# Patient Record
Sex: FEMALE | Race: WHITE | NOT HISPANIC OR LATINO | Employment: FULL TIME | URBAN - NONMETROPOLITAN AREA
[De-identification: names, ages, dates, MRNs, and addresses within clinical notes are randomized per-mention and may not be internally consistent; named-entity substitution may affect disease eponyms.]

---

## 2023-10-07 PROBLEM — R63.5 WEIGHT GAIN: Status: ACTIVE | Noted: 2023-10-07

## 2023-10-07 PROBLEM — N76.0 BACTERIAL VAGINOSIS: Status: ACTIVE | Noted: 2023-10-07

## 2023-10-07 PROBLEM — B96.89 BACTERIAL VAGINOSIS: Status: ACTIVE | Noted: 2023-10-07

## 2023-10-07 RX ORDER — PNV NO.95/FERROUS FUM/FOLIC AC 28MG-0.8MG
TABLET ORAL
COMMUNITY

## 2023-10-09 ENCOUNTER — APPOINTMENT (OUTPATIENT)
Dept: OBSTETRICS AND GYNECOLOGY | Facility: CLINIC | Age: 33
End: 2023-10-09
Payer: COMMERCIAL

## 2023-12-18 ENCOUNTER — OFFICE VISIT (OUTPATIENT)
Dept: OBSTETRICS AND GYNECOLOGY | Facility: CLINIC | Age: 33
End: 2023-12-18
Payer: COMMERCIAL

## 2023-12-18 VITALS
WEIGHT: 173.2 LBS | HEIGHT: 65 IN | DIASTOLIC BLOOD PRESSURE: 68 MMHG | BODY MASS INDEX: 28.86 KG/M2 | SYSTOLIC BLOOD PRESSURE: 120 MMHG

## 2023-12-18 DIAGNOSIS — Z12.4 ENCOUNTER FOR SCREENING FOR CERVICAL CANCER: ICD-10-CM

## 2023-12-18 DIAGNOSIS — Z01.419 ENCOUNTER FOR GYNECOLOGICAL EXAMINATION WITHOUT ABNORMAL FINDING: ICD-10-CM

## 2023-12-18 PROCEDURE — 99395 PREV VISIT EST AGE 18-39: CPT | Performed by: OBSTETRICS & GYNECOLOGY

## 2023-12-18 PROCEDURE — 88175 CYTOPATH C/V AUTO FLUID REDO: CPT

## 2023-12-18 PROCEDURE — 1036F TOBACCO NON-USER: CPT | Performed by: OBSTETRICS & GYNECOLOGY

## 2024-01-10 ENCOUNTER — TELEPHONE (OUTPATIENT)
Dept: OBSTETRICS AND GYNECOLOGY | Facility: CLINIC | Age: 34
End: 2024-01-10
Payer: COMMERCIAL

## 2024-01-10 LAB
CYTOLOGY CMNT CVX/VAG CYTO-IMP: NORMAL
LAB AP HPV GENOTYPE QUESTION: YES
LAB AP HPV HR: NORMAL
LAB AP PREVIOUS ABNORMAL HISTORY: NORMAL
LABORATORY COMMENT REPORT: NORMAL
LMP START DATE: NORMAL
PATH REPORT.TOTAL CANCER: NORMAL

## 2024-01-10 NOTE — TELEPHONE ENCOUNTER
----- Message from Matthew Carvalho MD sent at 1/10/2024 12:50 PM EST -----  Please inform patient of the normal Pap

## 2024-03-18 ENCOUNTER — TELEPHONE (OUTPATIENT)
Dept: PRIMARY CARE | Facility: CLINIC | Age: 34
End: 2024-03-18
Payer: COMMERCIAL

## 2024-03-18 DIAGNOSIS — L65.9 HAIR LOSS: Primary | ICD-10-CM

## 2024-03-18 NOTE — TELEPHONE ENCOUNTER
Also, I was just looking at previous notes and it does not look like I am seeing her for a very long time so probably the best advice would be for her to come in for a full assessment before we actually order lab work and render any opinions.  Tell her I would be happy to see her and see how long it has been since I saw her because she might be considered a new patient if it has been 3 years.  Thank you!

## 2024-03-18 NOTE — TELEPHONE ENCOUNTER
Please advise that we could check a TSH and I will place the order now.  Also sometimes a vitamin deficiency can create issues with what she describes and we usually recommend taking over-the-counter biotin supplement.  Thank you

## 2024-03-18 NOTE — TELEPHONE ENCOUNTER
Patient called and left . She has some concerns about brittle nails, dry skin, and thinning hair. She would like to know if you could place a full thyroid panel and whatever other necessary labs you think she should have done.

## 2024-03-21 ENCOUNTER — LAB (OUTPATIENT)
Dept: LAB | Facility: LAB | Age: 34
End: 2024-03-21
Payer: COMMERCIAL

## 2024-03-21 ENCOUNTER — OFFICE VISIT (OUTPATIENT)
Dept: PRIMARY CARE | Facility: CLINIC | Age: 34
End: 2024-03-21
Payer: COMMERCIAL

## 2024-03-21 DIAGNOSIS — Z13.220 LIPID SCREENING: Primary | ICD-10-CM

## 2024-03-21 DIAGNOSIS — L65.9 HAIR LOSS: ICD-10-CM

## 2024-03-21 DIAGNOSIS — Z00.00 ENCOUNTER FOR WELL ADULT EXAM WITHOUT ABNORMAL FINDINGS: Primary | ICD-10-CM

## 2024-03-21 PROBLEM — J34.2 NASAL SEPTAL DEVIATION: Status: RESOLVED | Noted: 2024-03-21 | Resolved: 2024-03-21

## 2024-03-21 PROBLEM — B96.89 BACTERIAL VAGINOSIS: Status: RESOLVED | Noted: 2023-10-07 | Resolved: 2024-03-21

## 2024-03-21 PROBLEM — R63.5 WEIGHT GAIN: Status: RESOLVED | Noted: 2023-10-07 | Resolved: 2024-03-21

## 2024-03-21 PROBLEM — N76.0 BACTERIAL VAGINOSIS: Status: RESOLVED | Noted: 2023-10-07 | Resolved: 2024-03-21

## 2024-03-21 LAB — TSH SERPL-ACNC: 2.24 MIU/L (ref 0.44–3.98)

## 2024-03-21 PROCEDURE — 36415 COLL VENOUS BLD VENIPUNCTURE: CPT

## 2024-03-21 PROCEDURE — 1036F TOBACCO NON-USER: CPT | Performed by: INTERNAL MEDICINE

## 2024-03-21 PROCEDURE — 84443 ASSAY THYROID STIM HORMONE: CPT

## 2024-03-21 PROCEDURE — 99395 PREV VISIT EST AGE 18-39: CPT | Performed by: INTERNAL MEDICINE

## 2024-03-21 ASSESSMENT — ENCOUNTER SYMPTOMS
BACK PAIN: 0
SHORTNESS OF BREATH: 0
FATIGUE: 0
ABDOMINAL PAIN: 0
DIARRHEA: 0
NAUSEA: 0
ARTHRALGIAS: 0
CHEST TIGHTNESS: 0
UNEXPECTED WEIGHT CHANGE: 0
WHEEZING: 0
PALPITATIONS: 0
VOMITING: 0
COUGH: 0
BLOOD IN STOOL: 0

## 2024-03-21 NOTE — PROGRESS NOTES
Subjective   Patient ID: Radha De Leon is a 33 y.o. female who presents for No chief complaint on file..  HPI  She is here today for her 1 year checkup.  She is looking well and for the most part reports feeling well.  We did conduct a full review of systems and her primary concern is that of some accelerated hair loss.  She reminds me that her grandmother suffered from thyroid condition and was diagnosed in her 30s.  We will have her get a TSH today and have agreed to contact her with results.  We also discussed possibly referral to dermatology if we do not find the cause of her hair loss.  We also discussed her overall health and she is working from home now most days.  She is at a computer but she states she has started exercising and gets on the [employer] bike most days with intense exercise.  She is already appreciated some weight loss and she is also noticed that her cloths are fitting more loosely.  We discussed how weight loss or fat loss rather can be appreciated in different ways and we also discussed how eating a healthy diet and controlling calories with exercise can afford long-term success without too much suffering.  We talked about possibly getting a standing desk and we talked about exercising outdoors with her family.  Her blood pressure is excellent today.  Overall she appears to be very healthy.  She also states that she has had an emotional time recently because a good friend has been diagnosed with a rare advanced cancer and she is only in her 30s.  We will contact her with the lab work and if everything goes according to plan we will see her back in 12 months for reevaluation  Review of Systems   Constitutional:  Negative for fatigue and unexpected weight change.   Respiratory:  Negative for cough, chest tightness, shortness of breath and wheezing.    Cardiovascular:  Negative for chest pain, palpitations and leg swelling.   Gastrointestinal:  Negative for abdominal pain, blood in stool,  diarrhea, nausea and vomiting.   Musculoskeletal:  Negative for arthralgias and back pain.     Objective   Physical Exam  Vitals and nursing note reviewed.   Constitutional:       General: She is not in acute distress.     Appearance: Normal appearance.   HENT:      Head: Normocephalic and atraumatic.      Nose: Nose normal.   Eyes:      Conjunctiva/sclera: Conjunctivae normal.   Cardiovascular:      Rate and Rhythm: Normal rate and regular rhythm.      Heart sounds: Normal heart sounds.   Pulmonary:      Effort: Pulmonary effort is normal. No respiratory distress.      Breath sounds: No wheezing.   Abdominal:      Palpations: Abdomen is soft.      Tenderness: There is no abdominal tenderness. There is no guarding.   Musculoskeletal:         General: No swelling. Normal range of motion.   Skin:     General: Skin is warm and dry.   Neurological:      General: No focal deficit present.      Mental Status: She is alert and oriented to person, place, and time.   Psychiatric:         Mood and Affect: Mood normal.         Behavior: Behavior normal.         Thought Content: Thought content normal.       Assessment/Plan   Problem List Items Addressed This Visit             ICD-10-CM    Hair loss L65.9     -We are checking a thyroid blood test and have agreed to contact her with results  -She has a grandmother who was diagnosed with thyroid disease in her mid 30s  -If her thyroid laboratory test is normal then we discussed referring her to dermatology for evaluation of her hair loss         Relevant Orders    TSH    Encounter for well adult exam without abnormal findings - Primary Z00.00     -On today's examination she appears to be well and we talked about nutrition along with exercise and healthy weight loss  -Her blood pressure is excellent and she has no abnormal physical findings  -She of course is encouraged to see her gynecologist on an annual basis as instructed by them               Estefani Cotto DO

## 2024-03-21 NOTE — ASSESSMENT & PLAN NOTE
-On today's examination she appears to be well and we talked about nutrition along with exercise and healthy weight loss  -Her blood pressure is excellent and she has no abnormal physical findings  -She of course is encouraged to see her gynecologist on an annual basis as instructed by them

## 2024-03-21 NOTE — ASSESSMENT & PLAN NOTE
-We are checking a thyroid blood test and have agreed to contact her with results  -She has a grandmother who was diagnosed with thyroid disease in her mid 30s  -If her thyroid laboratory test is normal then we discussed referring her to dermatology for evaluation of her hair loss

## 2024-03-21 NOTE — PATIENT INSTRUCTIONS
As we discussed we will have you get your thyroid blood test drawn today before leaving and I will contact you with results  If we did not find a source of hair loss  through thyroid testing then I will have you see dermatology for evaluation  -We will see you back in 1 year but sooner if any problem

## 2024-04-04 ENCOUNTER — LAB (OUTPATIENT)
Dept: LAB | Facility: LAB | Age: 34
End: 2024-04-04
Payer: COMMERCIAL

## 2024-04-04 DIAGNOSIS — Z13.220 LIPID SCREENING: ICD-10-CM

## 2024-04-04 LAB
CHOLEST SERPL-MCNC: 169 MG/DL (ref 0–199)
CHOLESTEROL/HDL RATIO: 2.9
HDLC SERPL-MCNC: 59 MG/DL
LDLC SERPL CALC-MCNC: 95 MG/DL
NON HDL CHOLESTEROL: 110 MG/DL (ref 0–149)
TRIGL SERPL-MCNC: 77 MG/DL (ref 0–149)
VLDL: 15 MG/DL (ref 0–40)

## 2024-04-04 PROCEDURE — 36415 COLL VENOUS BLD VENIPUNCTURE: CPT

## 2024-04-04 PROCEDURE — 80061 LIPID PANEL: CPT

## 2024-09-16 ENCOUNTER — HOSPITAL ENCOUNTER (OUTPATIENT)
Dept: RADIOLOGY | Facility: HOSPITAL | Age: 34
Discharge: HOME | End: 2024-09-16
Payer: COMMERCIAL

## 2024-09-16 ENCOUNTER — APPOINTMENT (OUTPATIENT)
Age: 34
End: 2024-09-16
Payer: COMMERCIAL

## 2024-09-16 VITALS
HEART RATE: 78 BPM | BODY MASS INDEX: 27.82 KG/M2 | OXYGEN SATURATION: 98 % | HEIGHT: 65 IN | WEIGHT: 167 LBS | SYSTOLIC BLOOD PRESSURE: 116 MMHG | DIASTOLIC BLOOD PRESSURE: 80 MMHG

## 2024-09-16 DIAGNOSIS — M54.2 NECK PAIN: Primary | ICD-10-CM

## 2024-09-16 DIAGNOSIS — M54.2 NECK PAIN: ICD-10-CM

## 2024-09-16 PROCEDURE — 1036F TOBACCO NON-USER: CPT | Performed by: INTERNAL MEDICINE

## 2024-09-16 PROCEDURE — 72040 X-RAY EXAM NECK SPINE 2-3 VW: CPT

## 2024-09-16 PROCEDURE — 72040 X-RAY EXAM NECK SPINE 2-3 VW: CPT | Performed by: RADIOLOGY

## 2024-09-16 PROCEDURE — 3008F BODY MASS INDEX DOCD: CPT | Performed by: INTERNAL MEDICINE

## 2024-09-16 PROCEDURE — 99213 OFFICE O/P EST LOW 20 MIN: CPT | Performed by: INTERNAL MEDICINE

## 2024-09-16 RX ORDER — NAPROXEN 500 MG/1
500 TABLET ORAL
Qty: 20 TABLET | Refills: 0 | Status: SHIPPED | OUTPATIENT
Start: 2024-09-16 | End: 2024-09-26

## 2024-09-16 ASSESSMENT — ENCOUNTER SYMPTOMS
NAUSEA: 0
DIARRHEA: 0
ABDOMINAL PAIN: 0
SHORTNESS OF BREATH: 0
FEVER: 0
NECK PAIN: 1
UNEXPECTED WEIGHT CHANGE: 0
VOMITING: 0
COUGH: 0
WHEEZING: 0
CHEST TIGHTNESS: 0
BACK PAIN: 0
BLOOD IN STOOL: 0
FATIGUE: 0
PALPITATIONS: 0

## 2024-09-16 NOTE — PATIENT INSTRUCTIONS
As we discussed I have ordered an x-ray of your neck and after leaving the office this morning you could go to the hospital to get that accomplished  Once the results are known I will contact you  I also sent a prescription into your pharmacy for medication called Naprosyn to be taken twice a day with food for the next 10 days  Please also look online for tips of adjusting your work environment because sometimes 1 can develop serious neck strain if you are sitting and looking at your computer in a nonergonomic fashion  Please call me if your condition is worsening as opposed to getting better and please give us an update at the end of your treatment

## 2024-09-16 NOTE — PROGRESS NOTES
Subjective   Patient ID: Radha De Leon is a 34 y.o. female who presents for Neck Pain.  She is here today for evaluation of her neck which began approximately 2 weeks ago.  She describes sometimes feeling a burning or numbness sensation on the right posterior lateral aspect of her neck.  She reminds me that she does a lot of desk work sitting in front of a computer and she also does part-time work at a nursing facility.  She states she does not recall any injuries leading up to this symptom.  She also has had no other issues such as numbness or tingling in her hands or feet.  She denies any facial asymmetry or vision disturbances or speech problems.  She states she also sometimes feels fullness in her right ear.  She denies any hearing loss.  We did conduct a full review of systems and on today's examination she exhibits good range of motion in her neck.  Her EACs are patent and TMs are unremarkable.  I do not appreciate any rash and there is no neck adenopathy.  I decided I will send her for an x-ray because she feels this is highly unusual in the context of her day-to-day living.  She states nothing has changed.  I also will place her on an anti-inflammatory for the next 10 days.  I will call her when the x-ray comes back and she will let me know if her condition is worsening as opposed to getting better.  I also discussed looking online to readjust the ergonomics of her work environment because neck strain is very common.  Review of Systems   Constitutional:  Negative for fatigue, fever and unexpected weight change.   HENT:  Negative for congestion.    Respiratory:  Negative for cough, chest tightness, shortness of breath and wheezing.    Cardiovascular:  Negative for chest pain, palpitations and leg swelling.   Gastrointestinal:  Negative for abdominal pain, blood in stool, diarrhea, nausea and vomiting.   Musculoskeletal:  Positive for neck pain. Negative for back pain.     Objective   Physical Exam  Vitals  and nursing note reviewed.   Constitutional:       General: She is not in acute distress.     Appearance: Normal appearance.   HENT:      Head: Normocephalic and atraumatic.   Eyes:      Conjunctiva/sclera: Conjunctivae normal.   Cardiovascular:      Rate and Rhythm: Normal rate and regular rhythm.      Heart sounds: Normal heart sounds.   Pulmonary:      Effort: No respiratory distress.      Breath sounds: No wheezing.   Abdominal:      Palpations: Abdomen is soft.      Tenderness: There is no abdominal tenderness. There is no guarding.   Musculoskeletal:         General: No swelling. Normal range of motion.   Skin:     General: Skin is warm and dry.   Neurological:      General: No focal deficit present.      Mental Status: She is alert and oriented to person, place, and time.   Psychiatric:         Behavior: Behavior normal.       Assessment/Plan   Problem List Items Addressed This Visit             ICD-10-CM    Neck pain - Primary M54.2     -Symptoms began approximately 2 weeks ago  -Complains of both numbness and sometimes burning on the right posterior lateral aspect of the neck  -No physical findings today  -I suspect that she does have possibly a nerve impingement or strain of the neck  -She will look at the ergonomics of her work environment and readjust  -I will have her go for an x-ray and have agreed to contact her with results  -I am placing her on Naprosyn twice daily for the next 10 days  -She will let us know how she is doing and she will call if her condition is worsening as opposed to getting better.         Relevant Medications    naproxen (Naprosyn) 500 mg tablet    Other Relevant Orders    XR cervical spine 2-3 views   PT INSTRUCTIONS  As we discussed I have ordered an x-ray of your neck and after leaving the office this morning you could go to the hospital to get that accomplished  Once the results are known I will contact you  I also sent a prescription into your pharmacy for medication called  Naprosyn to be taken twice a day with food for the next 10 days  Please also look online for tips of adjusting your work environment because sometimes 1 can develop serious neck strain if you are sitting and looking at your computer in a nonergonomic fashion  Please call me if your condition is worsening as opposed to getting better and please give us an update at the end of your treatment       Estefani Cotto, DO

## 2024-09-16 NOTE — ASSESSMENT & PLAN NOTE
-Symptoms began approximately 2 weeks ago  -Complains of both numbness and sometimes burning on the right posterior lateral aspect of the neck  -No physical findings today  -I suspect that she does have possibly a nerve impingement or strain of the neck  -She will look at the ergonomics of her work environment and readjust  -I will have her go for an x-ray and have agreed to contact her with results  -I am placing her on Naprosyn twice daily for the next 10 days  -She will let us know how she is doing and she will call if her condition is worsening as opposed to getting better.

## 2025-02-26 ENCOUNTER — OFFICE VISIT (OUTPATIENT)
Age: 35
End: 2025-02-26
Payer: COMMERCIAL

## 2025-02-26 VITALS
WEIGHT: 169 LBS | HEART RATE: 68 BPM | SYSTOLIC BLOOD PRESSURE: 120 MMHG | BODY MASS INDEX: 28.16 KG/M2 | HEIGHT: 65 IN | DIASTOLIC BLOOD PRESSURE: 70 MMHG

## 2025-02-26 DIAGNOSIS — Z13.220 SCREENING, LIPID: ICD-10-CM

## 2025-02-26 DIAGNOSIS — R10.13 EPIGASTRIC PAIN: ICD-10-CM

## 2025-02-26 DIAGNOSIS — R11.0 NAUSEA: ICD-10-CM

## 2025-02-26 DIAGNOSIS — M54.2 NECK PAIN: Primary | ICD-10-CM

## 2025-02-26 PROCEDURE — 3008F BODY MASS INDEX DOCD: CPT | Performed by: FAMILY MEDICINE

## 2025-02-26 PROCEDURE — 99214 OFFICE O/P EST MOD 30 MIN: CPT | Performed by: FAMILY MEDICINE

## 2025-02-26 RX ORDER — ONDANSETRON 8 MG/1
8 TABLET, ORALLY DISINTEGRATING ORAL EVERY 8 HOURS PRN
Qty: 20 TABLET | Refills: 0 | Status: SHIPPED | OUTPATIENT
Start: 2025-02-26 | End: 2025-03-05

## 2025-02-26 NOTE — PROGRESS NOTES
Upper abdominal swelling; states when she touches it makes her feel nauseated. Had seen [facility] in past for neck pain; still having that pain.

## 2025-02-26 NOTE — PROGRESS NOTES
Subjective  Radha De Leon is a 34 y.o. female who presents for Abdominal swelling.  HPI  Last few weeks has increased her physical activity.  Noticed a spot on her stomach that is getting bigger.  Felt the urge to vomit last night, had to sleep on two pillows.   Not tender to cough, but causes nausea.  No nsaids.  No vomiting.  Periods regular.  No cough, no fevers/chills, cough.  Had spaghetti for dinner last night, felt full, no pain.  Normal bms, no blood no dark tarry stools.  Neck pain , was seen in fall and had imaging, still bothering her quite a bit.  Right side of neck worse.  Works from home, doing some stretches etc.  Review of Systems   All other systems reviewed and are negative.  .    No Known Allergies    Current Outpatient Medications on File Prior to Visit   Medication Sig Dispense Refill    B complex-vitamin C-folic acid (Nephro-Yogi Rx) 1- mg-mg-mcg tablet Take 1 tablet by mouth once daily with breakfast.      iron,carb/vit C/vit B12/folic (IRON 100 PLUS ORAL) Take by mouth.      prenatal vit no.124-iron-folic (Prenatal Vitamin) 27 mg iron- 800 mcg tablet Take by mouth.       No current facility-administered medications on file prior to visit.       Patient Active Problem List   Diagnosis    Hair loss    Lipid screening    Neck pain       Objective     Visit Vitals  /70 (BP Location: Left arm, Patient Position: Sitting)   Pulse 68      Physical Exam  Vitals and nursing note reviewed.   Constitutional:       General: She is not in acute distress.     Appearance: Normal appearance. She is not toxic-appearing.   HENT:      Head: Normocephalic and atraumatic.   Cardiovascular:      Rate and Rhythm: Normal rate and regular rhythm.      Heart sounds: No murmur heard.  Pulmonary:      Effort: Pulmonary effort is normal.      Breath sounds: Normal breath sounds.   Abdominal:      Tenderness: There is no abdominal tenderness. There is no guarding or rebound.   Musculoskeletal:       Cervical back: Neck supple. No rigidity.      Comments:     Skin:     General: Skin is warm and dry.   Neurological:      General: No focal deficit present.      Mental Status: She is alert and oriented to person, place, and time.   Psychiatric:         Mood and Affect: Mood normal.         Behavior: Behavior normal.         Assessment/Plan   Problem List Items Addressed This Visit       Neck pain - Primary    Relevant Orders    Referral to Physical Therapy     Other Visit Diagnoses       Screening, lipid        Relevant Orders    Lipid Panel    Epigastric pain        Relevant Orders    CBC and Auto Differential    Comprehensive Metabolic Panel    Lipase    XR abdomen 2 views w chest 1 view    Urinalysis with Reflex Microscopic    hCG, Urine, Qualitative    Nausea        Relevant Medications    ondansetron ODT (Zofran-ODT) 8 mg disintegrating tablet             Follow up 1- 2 weeks with PCP, consider further imaging depending on symptoms and test results. Call concerns.       Rayne Bartlett MD

## 2025-02-27 LAB
ALBUMIN SERPL-MCNC: 4.6 G/DL (ref 3.6–5.1)
ALP SERPL-CCNC: 40 U/L (ref 31–125)
ALT SERPL-CCNC: 7 U/L (ref 6–29)
ANION GAP SERPL CALCULATED.4IONS-SCNC: 7 MMOL/L (CALC) (ref 7–17)
APPEARANCE UR: CLEAR
AST SERPL-CCNC: 10 U/L (ref 10–30)
BASOPHILS # BLD AUTO: 48 CELLS/UL (ref 0–200)
BASOPHILS NFR BLD AUTO: 0.7 %
BILIRUB SERPL-MCNC: 0.6 MG/DL (ref 0.2–1.2)
BILIRUB UR QL STRIP: NEGATIVE
BUN SERPL-MCNC: 12 MG/DL (ref 7–25)
CALCIUM SERPL-MCNC: 9.5 MG/DL (ref 8.6–10.2)
CHLORIDE SERPL-SCNC: 105 MMOL/L (ref 98–110)
CHOLEST SERPL-MCNC: 188 MG/DL
CHOLEST/HDLC SERPL: 3 (CALC)
CO2 SERPL-SCNC: 27 MMOL/L (ref 20–32)
COLOR UR: YELLOW
CREAT SERPL-MCNC: 0.87 MG/DL (ref 0.5–0.97)
EGFRCR SERPLBLD CKD-EPI 2021: 88 ML/MIN/1.73M2
EOSINOPHIL # BLD AUTO: 170 CELLS/UL (ref 15–500)
EOSINOPHIL NFR BLD AUTO: 2.5 %
ERYTHROCYTE [DISTWIDTH] IN BLOOD BY AUTOMATED COUNT: 13.1 % (ref 11–15)
GLUCOSE SERPL-MCNC: 80 MG/DL (ref 65–99)
GLUCOSE UR QL STRIP: NEGATIVE
HCT VFR BLD AUTO: 40.2 % (ref 35–45)
HDLC SERPL-MCNC: 62 MG/DL
HGB BLD-MCNC: 13.7 G/DL (ref 11.7–15.5)
HGB UR QL STRIP: NEGATIVE
KETONES UR QL STRIP: NEGATIVE
LDLC SERPL CALC-MCNC: 109 MG/DL (CALC)
LEUKOCYTE ESTERASE UR QL STRIP: NEGATIVE
LIPASE SERPL-CCNC: 24 U/L (ref 7–60)
LYMPHOCYTES # BLD AUTO: 1965 CELLS/UL (ref 850–3900)
LYMPHOCYTES NFR BLD AUTO: 28.9 %
MCH RBC QN AUTO: 30.4 PG (ref 27–33)
MCHC RBC AUTO-ENTMCNC: 34.1 G/DL (ref 32–36)
MCV RBC AUTO: 89.1 FL (ref 80–100)
MONOCYTES # BLD AUTO: 551 CELLS/UL (ref 200–950)
MONOCYTES NFR BLD AUTO: 8.1 %
NEUTROPHILS # BLD AUTO: 4066 CELLS/UL (ref 1500–7800)
NEUTROPHILS NFR BLD AUTO: 59.8 %
NITRITE UR QL STRIP: NEGATIVE
NONHDLC SERPL-MCNC: 126 MG/DL (CALC)
PH UR STRIP: 8 [PH] (ref 5–8)
PLATELET # BLD AUTO: 291 THOUSAND/UL (ref 140–400)
PMV BLD REES-ECKER: 10.2 FL (ref 7.5–12.5)
POTASSIUM SERPL-SCNC: 4.3 MMOL/L (ref 3.5–5.3)
PROT SERPL-MCNC: 7.1 G/DL (ref 6.1–8.1)
PROT UR QL STRIP: NEGATIVE
RBC # BLD AUTO: 4.51 MILLION/UL (ref 3.8–5.1)
SODIUM SERPL-SCNC: 139 MMOL/L (ref 135–146)
SP GR UR STRIP: 1.01 (ref 1–1.03)
TRIGL SERPL-MCNC: 82 MG/DL
WBC # BLD AUTO: 6.8 THOUSAND/UL (ref 3.8–10.8)

## 2025-03-03 ENCOUNTER — TELEPHONE (OUTPATIENT)
Age: 35
End: 2025-03-03
Payer: COMMERCIAL

## 2025-03-03 DIAGNOSIS — R93.89 ABNORMAL CXR: Primary | ICD-10-CM

## 2025-03-03 NOTE — TELEPHONE ENCOUNTER
Hello,  Thank you for sharing me in the messaging and I went ahead and ordered a CT scan of her chest.  Please call her and tell her it has been ordered and to arrange for a follow-up visit shortly after completion.  Thank you!

## 2025-03-03 NOTE — TELEPHONE ENCOUNTER
Called in asking about xray results from last week; states she can see them on her [employer] My chart shelli but not the  one. (Films done at [facility]) I did try to download the records but they did not come through for me. I faxed [facility] medical records and requested the report that way. I advised the pt SANDY has not seen them yet but when we get them I will put them on JO desk for review. Pt tearful; states the report she read showed an abnormality in her lung which has had her worried all weekend. I advised we will call her once SANDY sees the report and told her to keep her follow up apt with [facility] this week.

## 2025-03-05 ENCOUNTER — APPOINTMENT (OUTPATIENT)
Age: 35
End: 2025-03-05
Payer: COMMERCIAL

## 2025-03-05 ENCOUNTER — TELEPHONE (OUTPATIENT)
Age: 35
End: 2025-03-05

## 2025-03-05 DIAGNOSIS — R93.89 ABNORMAL CXR: ICD-10-CM

## 2025-03-05 DIAGNOSIS — R91.1 LUNG NODULE: Primary | ICD-10-CM

## 2025-03-05 NOTE — TELEPHONE ENCOUNTER
"Pt is concerned about getting authorization for the ct chest and wants it to be done sooner that the 2 weeks that she is scheduled for. Pt states she is frustrated and that it is mentally draining.     States she called insurance company and \"all they need is the NPI number of the facility and the 5 digit cbt code\".     Pt would like to go to the [facility] in [place].       "

## 2025-03-05 NOTE — TELEPHONE ENCOUNTER
I have patient's CT to do the prior auth. I am currently working on 9 authorizations and hope to have them completed by this afternoon. I apologize for the delay. I did fax the order over to [facility] so patient could at least get it scheduled while I'm working on the auth. I was unaware that this was an emergent need.

## 2025-03-12 ENCOUNTER — APPOINTMENT (OUTPATIENT)
Age: 35
End: 2025-03-12
Payer: COMMERCIAL

## 2025-03-12 VITALS
WEIGHT: 167.5 LBS | HEART RATE: 79 BPM | OXYGEN SATURATION: 100 % | BODY MASS INDEX: 27.91 KG/M2 | SYSTOLIC BLOOD PRESSURE: 128 MMHG | DIASTOLIC BLOOD PRESSURE: 82 MMHG | HEIGHT: 65 IN

## 2025-03-12 DIAGNOSIS — R06.09 DOE (DYSPNEA ON EXERTION): ICD-10-CM

## 2025-03-12 DIAGNOSIS — J06.9 UPPER RESPIRATORY TRACT INFECTION, UNSPECIFIED TYPE: Primary | ICD-10-CM

## 2025-03-12 DIAGNOSIS — F41.9 ANXIETY: ICD-10-CM

## 2025-03-12 PROBLEM — R91.1 LUNG NODULE: Status: RESOLVED | Noted: 2025-03-05 | Resolved: 2025-03-12

## 2025-03-12 PROBLEM — R93.89 ABNORMAL CXR: Status: RESOLVED | Noted: 2025-03-05 | Resolved: 2025-03-12

## 2025-03-12 PROBLEM — Z13.220 LIPID SCREENING: Status: RESOLVED | Noted: 2024-03-21 | Resolved: 2025-03-12

## 2025-03-12 PROCEDURE — 99214 OFFICE O/P EST MOD 30 MIN: CPT | Performed by: INTERNAL MEDICINE

## 2025-03-12 PROCEDURE — 3008F BODY MASS INDEX DOCD: CPT | Performed by: INTERNAL MEDICINE

## 2025-03-12 RX ORDER — FLUTICASONE PROPIONATE 50 MCG
1 SPRAY, SUSPENSION (ML) NASAL DAILY
Qty: 16 G | Refills: 5 | Status: SHIPPED | OUTPATIENT
Start: 2025-03-12 | End: 2026-03-12

## 2025-03-12 RX ORDER — AZITHROMYCIN 250 MG/1
TABLET, FILM COATED ORAL
Qty: 6 TABLET | Refills: 0 | Status: SHIPPED | OUTPATIENT
Start: 2025-03-12 | End: 2025-03-17

## 2025-03-12 ASSESSMENT — ENCOUNTER SYMPTOMS
BLOOD IN STOOL: 0
WHEEZING: 0
NAUSEA: 0
SHORTNESS OF BREATH: 0
CHEST TIGHTNESS: 0
BACK PAIN: 0
PALPITATIONS: 0
COUGH: 1
ARTHRALGIAS: 0
VOMITING: 0
ABDOMINAL PAIN: 0
FATIGUE: 0
DIARRHEA: 0

## 2025-03-12 ASSESSMENT — PATIENT HEALTH QUESTIONNAIRE - PHQ9
1. LITTLE INTEREST OR PLEASURE IN DOING THINGS: NOT AT ALL
2. FEELING DOWN, DEPRESSED OR HOPELESS: NOT AT ALL
SUM OF ALL RESPONSES TO PHQ9 QUESTIONS 1 AND 2: 0

## 2025-03-12 NOTE — PATIENT INSTRUCTIONS
Patient instructions  As we discussed I am pleased with the results of your CT scan and keep in mind that the radiologist indicates they do not see any evidence of coronary calcifications.  This is always a good sign.  Unfortunately her mother had heart disease in her 60s but she was also a heavy smoker.  Your recent cholesterol checkup looks very good.  I am also pleased that you exercise and please never take up smoking  Because of family history and your symptoms I have decided to order an EKG for safe measure and the staff will help you arrange to have your testing at [facility].  Please mention it to them when you go upfront  Also with your respiratory symptoms I do believe that an infection could be contributing to your recent symptoms.  I am giving you the antibiotic called Z-Ranjeet and please take this as directed.  Please also use that Flonase nasal spray every single day until I see you back  Also keep in mind that anxiety could be playing a role in your symptoms and we can discuss this at your follow-up visit

## 2025-03-12 NOTE — ASSESSMENT & PLAN NOTE
-She has had a lot of distressing things happen with her family and friends including unusual medical conditions including lung cancer as well as heart attack etc.  It is understandable why she is experiencing anxiety at this time

## 2025-03-12 NOTE — PROGRESS NOTES
Subjective   Patient ID: Radha De Leon is a 34 y.o. female who presents for Follow-up (REV CT RESULTS ).  HPI  She is here today for follow-up.  Over the past several weeks she has been experiencing a lot of concerning symptoms.  In fact she saw Dr. Bartlett during my absence.  She had multiple labs performed as well as a chest x-ray.  Her lab work came back completely normal.  Her x-ray showed a possible suggestion of a lung lesion.  We proceeded to evaluate with CT scan and while she was waiting for her test to be performed she was having a tremendous amount of anxiety.  She states it was over the top.  Thankfully her CT scan which was done on the seventh at [facility] came back showing no significant abnormalities.  The lung lesion of question is not present and the also commented that she had no coronary calcium present.  We discussed some very distressing things that have happened to her in the recent past.  She states she has a dear friend who is young and is fighting the theodore of lung cancer.  Also her mother had 2 stents placed in her 60s.  She was a heavy smoker.  She also tells me that her boyfriend at age 36  suddenly from a heart attack.  We discussed how these events would be quite troubling and also be a good cause for heightened anxiety.  She states that she normally exercises pretty regularly and in the recent past when she has been walking she starts feeling short of breath and her heart rate goes up to 115.  She has been having some respiratory symptoms with upper nasal congestion and cough and she is producing discolored mucus.  She states she thinks her  had the flu about a month ago and she had some mild symptoms that she thought resolved.  Clearly the symptoms she describes today sounds like an upper respiratory infection and I told her that this could be producing her symptoms of shortness of breath.  I am going to treat her with an antibiotic and Flonase nasal spray.  Also going  to order an EKG for safe measure  We will see her back in follow-up to make sure all is going well.  Of note she has been seeing the dermatologist for unusual hair loss and recently had thyroid testing which came back normal.  Review of Systems   Constitutional:  Negative for fatigue.   HENT:  Positive for congestion and postnasal drip.    Respiratory:  Positive for cough. Negative for chest tightness, shortness of breath and wheezing.         DUBON   Cardiovascular:  Negative for chest pain, palpitations and leg swelling.   Gastrointestinal:  Negative for abdominal pain, blood in stool, diarrhea, nausea and vomiting.   Musculoskeletal:  Negative for arthralgias and back pain.     Objective   Physical Exam  Vitals and nursing note reviewed.   Constitutional:       General: She is not in acute distress.     Appearance: Normal appearance.   HENT:      Head: Normocephalic and atraumatic.   Eyes:      Conjunctiva/sclera: Conjunctivae normal.   Cardiovascular:      Rate and Rhythm: Normal rate and regular rhythm.      Heart sounds: Normal heart sounds.   Pulmonary:      Effort: No respiratory distress.      Breath sounds: No wheezing.   Abdominal:      Palpations: Abdomen is soft.      Tenderness: There is no abdominal tenderness. There is no guarding.   Musculoskeletal:         General: No swelling. Normal range of motion.   Skin:     General: Skin is warm and dry.   Neurological:      General: No focal deficit present.      Mental Status: She is alert and oriented to person, place, and time.   Psychiatric:         Behavior: Behavior normal.       Recent Results (from the past 4 weeks)   CBC and Auto Differential    Collection Time: 02/26/25  9:07 AM   Result Value Ref Range    WHITE BLOOD CELL COUNT 6.8 3.8 - 10.8 Thousand/uL    RED BLOOD CELL COUNT 4.51 3.80 - 5.10 Million/uL    HEMOGLOBIN 13.7 11.7 - 15.5 g/dL    HEMATOCRIT 40.2 35.0 - 45.0 %    MCV 89.1 80.0 - 100.0 fL    MCH 30.4 27.0 - 33.0 pg    MCHC 34.1 32.0 -  36.0 g/dL    RDW 13.1 11.0 - 15.0 %    PLATELET COUNT 291 140 - 400 Thousand/uL    MPV 10.2 7.5 - 12.5 fL    ABSOLUTE NEUTROPHILS 4,066 1,500 - 7,800 cells/uL    ABSOLUTE LYMPHOCYTES 1,965 850 - 3,900 cells/uL    ABSOLUTE MONOCYTES 551 200 - 950 cells/uL    ABSOLUTE EOSINOPHILS 170 15 - 500 cells/uL    ABSOLUTE BASOPHILS 48 0 - 200 cells/uL    NEUTROPHILS 59.8 %    LYMPHOCYTES 28.9 %    MONOCYTES 8.1 %    EOSINOPHILS 2.5 %    BASOPHILS 0.7 %   Comprehensive Metabolic Panel    Collection Time: 02/26/25  9:07 AM   Result Value Ref Range    GLUCOSE 80 65 - 99 mg/dL    UREA NITROGEN (BUN) 12 7 - 25 mg/dL    CREATININE 0.87 0.50 - 0.97 mg/dL    EGFR 88 > OR = 60 mL/min/1.73m2    SODIUM 139 135 - 146 mmol/L    POTASSIUM 4.3 3.5 - 5.3 mmol/L    CHLORIDE 105 98 - 110 mmol/L    CARBON DIOXIDE 27 20 - 32 mmol/L    ELECTROLYTE BALANCE 7 7 - 17 mmol/L (calc)    CALCIUM 9.5 8.6 - 10.2 mg/dL    PROTEIN, TOTAL 7.1 6.1 - 8.1 g/dL    ALBUMIN 4.6 3.6 - 5.1 g/dL    BILIRUBIN, TOTAL 0.6 0.2 - 1.2 mg/dL    ALKALINE PHOSPHATASE 40 31 - 125 U/L    AST 10 10 - 30 U/L    ALT 7 6 - 29 U/L   Lipase    Collection Time: 02/26/25  9:07 AM   Result Value Ref Range    LIPASE 24 7 - 60 U/L   Urinalysis with Reflex Microscopic    Collection Time: 02/26/25  9:07 AM   Result Value Ref Range    COLOR YELLOW YELLOW    APPEARANCE CLEAR CLEAR    SPECIFIC GRAVITY 1.006 1.001 - 1.035    PH 8.0 5.0 - 8.0    GLUCOSE NEGATIVE NEGATIVE    BILIRUBIN NEGATIVE NEGATIVE    KETONES NEGATIVE NEGATIVE    OCCULT BLOOD NEGATIVE NEGATIVE    PROTEIN NEGATIVE NEGATIVE    NITRITE NEGATIVE NEGATIVE    LEUKOCYTE ESTERASE NEGATIVE NEGATIVE   Lipid Panel    Collection Time: 02/26/25  9:07 AM   Result Value Ref Range    CHOLESTEROL, TOTAL 188 <200 mg/dL    HDL CHOLESTEROL 62 > OR = 50 mg/dL    TRIGLYCERIDES 82 <150 mg/dL    LDL-CHOLESTEROL 109 (H) mg/dL (calc)    CHOL/HDLC RATIO 3.0 <5.0 (calc)    NON HDL CHOLESTEROL 126 <130 mg/dL (calc)       Assessment/Plan   Problem List  Items Addressed This Visit             ICD-10-CM    Upper respiratory tract infection - Primary J06.9    Relevant Medications    fluticasone (Flonase) 50 mcg/actuation nasal spray    azithromycin (Zithromax) 250 mg tablet    Anxiety F41.9     -She has had a lot of distressing things happen with her family and friends including unusual medical conditions including lung cancer as well as heart attack etc.  It is understandable why she is experiencing anxiety at this time         DUBON (dyspnea on exertion) R06.09     -Her symptoms have been recent  -She does have features of an upper respiratory infection  -Recent CT scan of the lung is negative  -I am treating with antibiotic and Flonase nasal spray  -We will see her back in 2 weeks for follow-up  -Her recent CT scan at [facility] from 3 - 7 - 25 mentions that there are no coronary calcifications  -Because of her family history I will do an EKG for safe measure         Relevant Orders    ECG 12 lead (Ancillary Performed)   Patient instructions  As we discussed I am pleased with the results of your CT scan and keep in mind that the radiologist indicates they do not see any evidence of coronary calcifications.  This is always a good sign.  Unfortunately her mother had heart disease in her 60s but she was also a heavy smoker.  Your recent cholesterol checkup looks very good.  I am also pleased that you exercise and please never take up smoking  Because of family history and your symptoms I have decided to order an EKG for safe measure and the staff will help you arrange to have your testing at [facility].  Please mention it to them when you go upfront  Also with your respiratory symptoms I do believe that an infection could be contributing to your recent symptoms.  I am giving you the antibiotic called Lindy and please take this as directed.  Please also use that Flonase nasal spray every single day until I see you back  Also keep in mind that anxiety could be playing a  role in your symptoms and we can discuss this at your follow-up visit       Estefani Cotto, DO

## 2025-03-12 NOTE — ASSESSMENT & PLAN NOTE
-Her symptoms have been recent  -She does have features of an upper respiratory infection  -Recent CT scan of the lung is negative  -I am treating with antibiotic and Flonase nasal spray  -We will see her back in 2 weeks for follow-up  -Her recent CT scan at [facility] from 3 - 7 - 25 mentions that there are no coronary calcifications  -Because of her family history I will do an EKG for safe measure

## 2025-03-27 ENCOUNTER — APPOINTMENT (OUTPATIENT)
Age: 35
End: 2025-03-27
Payer: COMMERCIAL

## 2025-03-27 VITALS
WEIGHT: 168.2 LBS | SYSTOLIC BLOOD PRESSURE: 112 MMHG | OXYGEN SATURATION: 100 % | BODY MASS INDEX: 28.02 KG/M2 | DIASTOLIC BLOOD PRESSURE: 72 MMHG | HEIGHT: 65 IN | HEART RATE: 68 BPM

## 2025-03-27 DIAGNOSIS — R06.09 DOE (DYSPNEA ON EXERTION): ICD-10-CM

## 2025-03-27 DIAGNOSIS — T78.40XA ALLERGIC REACTION TO DRUG, INITIAL ENCOUNTER: Primary | ICD-10-CM

## 2025-03-27 PROBLEM — J06.9 UPPER RESPIRATORY TRACT INFECTION: Status: RESOLVED | Noted: 2025-03-12 | Resolved: 2025-03-27

## 2025-03-27 PROCEDURE — 3008F BODY MASS INDEX DOCD: CPT | Performed by: INTERNAL MEDICINE

## 2025-03-27 PROCEDURE — 99213 OFFICE O/P EST LOW 20 MIN: CPT | Performed by: INTERNAL MEDICINE

## 2025-03-27 PROCEDURE — 1036F TOBACCO NON-USER: CPT | Performed by: INTERNAL MEDICINE

## 2025-03-27 ASSESSMENT — PATIENT HEALTH QUESTIONNAIRE - PHQ9
SUM OF ALL RESPONSES TO PHQ9 QUESTIONS 1 AND 2: 0
2. FEELING DOWN, DEPRESSED OR HOPELESS: NOT AT ALL
1. LITTLE INTEREST OR PLEASURE IN DOING THINGS: NOT AT ALL

## 2025-03-27 NOTE — PATIENT INSTRUCTIONS
Patient instructions  As we discussed I am pleased with how well you are improving with your symptoms and if you do not continue to improve over the next several weeks please give us a call.  We discussed the possibility of asthma  You may also try over-the-counter Pepcid or famotidine because we do routinely recommend this medication for allergic reactions  We have placed a Z-Ranjeet on your list as an allergy  We will see you back on an as-needed basis

## 2025-03-27 NOTE — PROGRESS NOTES
Subjective   Patient ID: Radha De Leon is a 34 y.o. female who presents for Follow-up (2 WK CK).  HPI  She is here today for follow-up.  She had her EKG at [facility] and it came back entirely normal.  We also had prescribed a Z-Ranjeet and Flonase nasal spray 2 weeks ago for some respiratory symptoms.  She states those symptoms have improved but unfortunately she has developed a rash that is highly suspicious for an allergic reaction.  I told her I would put Z-Ranjeet on her list of allergies.  She is doing the correct thing by taking Benadryl at night and Claritin during the day.  I have also recommended she take over-the-counter Pepcid for the next 5 to 10 days.  We also discussed the possibility of a steroid but both agreed it was not necessary at this time.  She states that her breathing is improving and we discussed how her shortness of breath could have been the aftermath from her infection.  The important thing is that she is getting better.  We did discuss the possibility of asthma and if she continues to have breathing issues a month down the road I encouraged her to call as we could test for asthma.  We also briefly discussed how anxiety could be playing a role.  Otherwise she is looking good today and we will see her back as needed or sooner if any problems.  Objective   Physical Exam  Vitals and nursing note reviewed.   Constitutional:       General: She is not in acute distress.     Appearance: Normal appearance.   HENT:      Head: Normocephalic and atraumatic.   Eyes:      Conjunctiva/sclera: Conjunctivae normal.   Cardiovascular:      Rate and Rhythm: Normal rate and regular rhythm.      Heart sounds: Normal heart sounds.   Pulmonary:      Effort: No respiratory distress.      Breath sounds: No wheezing.   Abdominal:      Palpations: Abdomen is soft.      Tenderness: There is no abdominal tenderness. There is no guarding.   Musculoskeletal:         General: No swelling. Normal range of motion.   Skin:      General: Skin is warm and dry.   Neurological:      General: No focal deficit present.      Mental Status: She is alert and oriented to person, place, and time.   Psychiatric:         Behavior: Behavior normal.       Assessment/Plan   Problem List Items Addressed This Visit             ICD-10-CM    DUBON (dyspnea on exertion) R06.09     -Her dyspnea on exertion is improving and therefore we do not need to do anything else at this time.  I did advise that if she continue to have symptoms beyond the next several weeks we could evaluate for possibility of asthma         Allergic drug reaction - Primary T78.40XA     -She developed a papular rash shortly after completion of her Z-Ranjeet and I do strongly feel that this is an allergic reaction  -She will continue with Benadryl, Claritin, and I suggested she try famotidine or Pepcid  -She will call if her rash does not clear        Patient instructions  As we discussed I am pleased with how well you are improving with your symptoms and if you do not continue to improve over the next several weeks please give us a call.  We discussed the possibility of asthma  You may also try over-the-counter Pepcid or famotidine because we do routinely recommend this medication for allergic reactions  We have placed a Z-Ranjeet on your list as an allergy  We will see you back on an as-needed basis       Estefani Ctoto,

## 2025-03-27 NOTE — ASSESSMENT & PLAN NOTE
-She developed a papular rash shortly after completion of her Z-Ranjeet and I do strongly feel that this is an allergic reaction  -She will continue with Benadryl, Claritin, and I suggested she try famotidine or Pepcid  -She will call if her rash does not clear

## 2025-03-27 NOTE — ASSESSMENT & PLAN NOTE
-Her dyspnea on exertion is improving and therefore we do not need to do anything else at this time.  I did advise that if she continue to have symptoms beyond the next several weeks we could evaluate for possibility of asthma